# Patient Record
Sex: MALE | Race: WHITE
[De-identification: names, ages, dates, MRNs, and addresses within clinical notes are randomized per-mention and may not be internally consistent; named-entity substitution may affect disease eponyms.]

---

## 2017-04-15 ENCOUNTER — HOSPITAL ENCOUNTER (EMERGENCY)
Dept: HOSPITAL 63 - ER | Age: 62
Discharge: ANOTHER HOSPITAL | End: 2017-04-15
Payer: MEDICARE

## 2017-04-15 VITALS
DIASTOLIC BLOOD PRESSURE: 84 MMHG | DIASTOLIC BLOOD PRESSURE: 84 MMHG | SYSTOLIC BLOOD PRESSURE: 158 MMHG | SYSTOLIC BLOOD PRESSURE: 158 MMHG | SYSTOLIC BLOOD PRESSURE: 158 MMHG | DIASTOLIC BLOOD PRESSURE: 84 MMHG | DIASTOLIC BLOOD PRESSURE: 84 MMHG | SYSTOLIC BLOOD PRESSURE: 158 MMHG

## 2017-04-15 VITALS — WEIGHT: 150 LBS | BODY MASS INDEX: 25.61 KG/M2 | HEIGHT: 64 IN
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
## 2017-04-15 DIAGNOSIS — E03.9: ICD-10-CM

## 2017-04-15 DIAGNOSIS — S72.001A: Primary | ICD-10-CM

## 2017-04-15 DIAGNOSIS — I12.9: ICD-10-CM
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
## 2017-04-15 DIAGNOSIS — I25.10: ICD-10-CM

## 2017-04-15 DIAGNOSIS — Z95.1: ICD-10-CM

## 2017-04-15 DIAGNOSIS — E11.22: ICD-10-CM

## 2017-04-15 DIAGNOSIS — E78.00: ICD-10-CM
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
## 2017-04-15 DIAGNOSIS — N18.9: ICD-10-CM

## 2017-04-15 PROCEDURE — 96374 THER/PROPH/DIAG INJ IV PUSH: CPT

## 2017-04-15 PROCEDURE — 99285 EMERGENCY DEPT VISIT HI MDM: CPT

## 2017-04-15 PROCEDURE — 96375 TX/PRO/DX INJ NEW DRUG ADDON: CPT

## 2017-04-15 PROCEDURE — 73502 X-RAY EXAM HIP UNI 2-3 VIEWS: CPT

## 2017-04-15 NOTE — RAD
HIP RIGHT 2V WITH PELVIS



Clinical Indication: pain s/p fall



Comparison: None.



Technique: Frontal view the pelvis and frontal and frog-leg lateral views of

the right hip are obtained.



Findings: 

There is an acute, traumatic, displaced fracture involving the right femoral

neck with superior displacement of the distal fracture fragment by

approximately 2.5 cm. Right hip joint is maintained. Bony pelvis appears

intact. Left hip joint is maintained. Surrounding soft tissues demonstrate no

acute finding. Prominent arterial calcifications are present.



IMPRESSION:

Acute, displaced right femoral neck fracture, as detailed above.

## 2017-04-15 NOTE — ED.ADGEN
Past History


Past Medical History:  CAD, Diabetes, High Cholesterol, Hypertension, 

Hypothyroid, Immunosuppression, Renal Disease, Renal Failure


Past Surgical History:  Colectomy, Coronary Bypass Surgery, Other


Alcohol Use:  None


Drug Use:  None





Adult General


HPI


HPI





Patient is a 61-year-old male presents emergency department complaining of 

right hip pain. Earlier today he had a mechanical slip and fall landing on that 

hip. Since that time he said difficulty with ambulation had increasing pain. 

Patient denies any other injuries.





Review of Systems


Review of Systems





Constitutional: Denies fever or chills []


Eyes: Denies change in visual acuity, redness, or eye pain []


HENT: Denies nasal congestion or sore throat []


Respiratory: Denies cough or shortness of breath []


Cardiovascular: No additional information not addressed in HPI []


GI: Denies abdominal pain, nausea, vomiting, bloody stools or diarrhea []


: Denies dysuria or hematuria []


Musculoskeletal: Denies back pain or joint pain []


Integument: Denies rash or skin lesions []


Neurologic: Denies headache, focal weakness or sensory changes []


Endocrine: Denies polyuria or polydipsia []





Current Medications


Current Medications





 Current Medications








 Medications


  (Trade)  Dose


 Ordered  Sig/Bhavin  Start Time


 Stop Time Status Last Admin


Dose Admin


 


 Fentanyl Citrate


  (Fentanyl 2ml


 Vial)  75 mcg  1X  ONCE  4/15/17 11:30


 4/15/17 11:32 DC 4/15/17 11:31


75 MCG


 


 Hydromorphone HCl


  (Dilaudid)  0.5 mg  1X  ONCE  4/15/17 12:30


 4/15/17 12:31 DC 4/15/17 12:17


0.5 MG











Allergies


Allergies





 Allergies








Coded Allergies Type Severity Reaction Last Updated Verified


 


  No Known Drug Allergies    12/14/15 No











Physical Exam


Physical Exam





Constitutional: Well developed, well nourished, no acute distress, non-toxic 

appearance. []


HENT: Normocephalic, atraumatic, bilateral external ears normal, oropharynx 

moist, no oral exudates, nose normal. []


Eyes: PERRLA, EOMI, conjunctiva normal, no discharge. [] 


Neck: Normal range of motion, no tenderness, supple, no stridor. [] 


Cardiovascular:Heart rate regular rhythm, no murmur []


Lungs & Thorax:  Bilateral breath sounds clear to auscultation []


Abdomen: Bowel sounds normal, soft, no tenderness, no masses, no pulsatile 

masses. [] 


Skin: Warm, dry, no erythema, no rash. [] 


Extremities: Patient is tender to palpation over his right greater trochanter, 

leg is shortened with minimal rotation.


Neurologic: Alert and oriented X 3, normal motor function, normal sensory 

function, no focal deficits noted. []


Psychologic: Affect normal, judgement normal, mood normal. []





Current Patient Data


Vital Signs





 Vital Signs








  Date Time  Temp Pulse Resp B/P Pulse Ox O2 Delivery O2 Flow Rate FiO2


 


4/15/17 12:25  102 12 158/84 96 Room Air  


 


4/15/17 10:55 97.9       











EKG


EKG


[]





Radiology/Procedures


Radiology/Procedures


HIP RIGHT 2V WITH PELVIS





Clinical Indication: pain s/p fall





Comparison: None.





Technique: Frontal view the pelvis and frontal and frog-leg lateral views of


the right hip are obtained.





Findings: 


There is an acute, traumatic, displaced fracture involving the right femoral


neck with superior displacement of the distal fracture fragment by


approximately 2.5 cm. Right hip joint is maintained. Bony pelvis appears


intact. Left hip joint is maintained. Surrounding soft tissues demonstrate no


acute finding. Prominent arterial calcifications are present.





IMPRESSION:


Acute, displaced right femoral neck fracture, as detailed above.














DICTATED AND SIGNED BY:     CHAN CHRISTIAN MD


DATE:     04/15/17 1126





CC: RAUL TERAN MD; LORI CEDILLO MD ~[]





Course & Med Decision Making


Course & Med Decision Making


Pertinent Labs and Imaging studies reviewed. (See chart for details)


Patient does indeed have a right hip fracture. He has been transferred to 

[facility] treatment.


[]





Final Impression


Final Impression


Right hip fracture []


Problems:  





Dragon Disclaimer


Dragon Disclaimer


This electronic medical record was generated, in whole or in part, using a 

voice recognition dictation system.








RAUL TERAN MD Apr 15, 2017 14:38

## 2017-06-27 ENCOUNTER — HOSPITAL ENCOUNTER (OUTPATIENT)
Dept: HOSPITAL 63 - LAB | Age: 62
Discharge: HOME | End: 2017-06-27
Attending: NURSE PRACTITIONER
Payer: MEDICARE

## 2017-06-27 DIAGNOSIS — Z94.0: ICD-10-CM

## 2017-06-27 DIAGNOSIS — N18.9: ICD-10-CM

## 2017-06-27 DIAGNOSIS — E11.29: ICD-10-CM

## 2017-06-27 DIAGNOSIS — I12.9: Primary | ICD-10-CM

## 2017-06-27 LAB
ALBUMIN SERPL-MCNC: 3.2 G/DL (ref 3.4–5)
ALP SERPL-CCNC: 86 U/L (ref 46–116)
ALT SERPL-CCNC: 14 U/L (ref 16–63)
ANION GAP SERPL CALC-SCNC: 5 MMOL/L (ref 6–14)
AST SERPL-CCNC: 10 U/L (ref 15–37)
BILIRUB DIRECT SERPL-MCNC: 0.1 MG/DL (ref 0–0.2)
BILIRUB SERPL-MCNC: 0.4 MG/DL (ref 0.2–1)
CA-I SERPL ISE-MCNC: 14 MG/DL (ref 8–26)
CALCIUM SERPL-MCNC: 9.2 MG/DL (ref 8.5–10.1)
CHLORIDE SERPL-SCNC: 102 MMOL/L (ref 98–107)
CHOLEST SERPL-MCNC: 146 MG/DL (ref 0–200)
CHOLEST/HDLC SERPL: 2 {RATIO}
CO2 SERPL-SCNC: 32 MMOL/L (ref 21–32)
CREAT SERPL-MCNC: 0.6 MG/DL (ref 0.7–1.3)
GFR SERPLBLD BASED ON 1.73 SQ M-ARVRAT: 136.5 ML/MIN
GLUCOSE SERPL-MCNC: 166 MG/DL (ref 70–99)
HDLC SERPL-MCNC: 52 MG/DL (ref 40–60)
LDLC: 83 MG/DL (ref 0–100)
PHOSPHATE SERPL-MCNC: 3.9 MG/DL (ref 2.6–4.7)
POTASSIUM SERPL-SCNC: 4.5 MMOL/L (ref 3.5–5.1)
PROT SERPL-MCNC: 7.3 G/DL (ref 6.4–8.2)
SODIUM SERPL-SCNC: 139 MMOL/L (ref 136–145)
TRIGL SERPL-MCNC: 57 MG/DL (ref 0–150)
VLDLC: 11 MG/DL (ref 0–40)

## 2017-06-27 PROCEDURE — 83036 HEMOGLOBIN GLYCOSYLATED A1C: CPT

## 2017-06-27 PROCEDURE — 80061 LIPID PANEL: CPT

## 2017-06-27 PROCEDURE — 80076 HEPATIC FUNCTION PANEL: CPT

## 2017-06-27 PROCEDURE — 80069 RENAL FUNCTION PANEL: CPT

## 2017-06-27 PROCEDURE — 80197 ASSAY OF TACROLIMUS: CPT

## 2017-06-27 PROCEDURE — 36415 COLL VENOUS BLD VENIPUNCTURE: CPT

## 2017-06-28 LAB — HBA1C MFR BLD: 7.1 % (ref 4.8–5.6)

## 2017-09-06 ENCOUNTER — HOSPITAL ENCOUNTER (OUTPATIENT)
Dept: HOSPITAL 63 - LAB | Age: 62
Discharge: HOME | End: 2017-09-06
Payer: MEDICARE

## 2017-09-06 DIAGNOSIS — C18.9: Primary | ICD-10-CM

## 2017-09-06 LAB
ALBUMIN SERPL-MCNC: 3 G/DL (ref 3.4–5)
ALBUMIN/GLOB SERPL: 0.8 {RATIO} (ref 1–1.7)
ALP SERPL-CCNC: 82 U/L (ref 46–116)
ALT SERPL-CCNC: 17 U/L (ref 16–63)
ANION GAP SERPL CALC-SCNC: 4 MMOL/L (ref 6–14)
AST SERPL-CCNC: 8 U/L (ref 15–37)
BASOPHILS # BLD AUTO: 0 X10^3/UL (ref 0–0.2)
BASOPHILS NFR BLD: 1 % (ref 0–3)
BILIRUB SERPL-MCNC: 0.4 MG/DL (ref 0.2–1)
BUN/CREAT SERPL: 23 (ref 6–20)
CA-I SERPL ISE-MCNC: 14 MG/DL (ref 8–26)
CALCIUM SERPL-MCNC: 9.4 MG/DL (ref 8.5–10.1)
CHLORIDE SERPL-SCNC: 104 MMOL/L (ref 98–107)
CO2 SERPL-SCNC: 32 MMOL/L (ref 21–32)
CREAT SERPL-MCNC: 0.6 MG/DL (ref 0.7–1.3)
EOSINOPHIL NFR BLD: 0.1 X10^3/UL (ref 0–0.7)
EOSINOPHIL NFR BLD: 2 % (ref 0–3)
ERYTHROCYTE [DISTWIDTH] IN BLOOD BY AUTOMATED COUNT: 16.3 % (ref 11.5–14.5)
GFR SERPLBLD BASED ON 1.73 SQ M-ARVRAT: 136.1 ML/MIN
GLOBULIN SER-MCNC: 4 G/DL (ref 2.2–3.8)
GLUCOSE SERPL-MCNC: 175 MG/DL (ref 70–99)
HCT VFR BLD CALC: 41.8 % (ref 39–53)
HGB BLD-MCNC: 13.6 G/DL (ref 13–17.5)
LYMPHOCYTES # BLD: 1.4 X10^3/UL (ref 1–4.8)
LYMPHOCYTES NFR BLD AUTO: 21 % (ref 24–48)
MCH RBC QN AUTO: 26 PG (ref 25–35)
MCHC RBC AUTO-ENTMCNC: 33 G/DL (ref 31–37)
MCV RBC AUTO: 81 FL (ref 79–100)
MONO #: 0.6 X10^3/UL (ref 0–1.1)
MONOCYTES NFR BLD: 9 % (ref 0–9)
NEUT #: 4.4 X10^3UL (ref 1.8–7.7)
NEUTROPHILS NFR BLD AUTO: 68 % (ref 31–73)
PLATELET # BLD AUTO: 222 X10^3/UL (ref 140–400)
POTASSIUM SERPL-SCNC: 4.9 MMOL/L (ref 3.5–5.1)
PROT SERPL-MCNC: 7 G/DL (ref 6.4–8.2)
RBC # BLD AUTO: 5.19 X10^6/UL (ref 4.3–5.7)
SODIUM SERPL-SCNC: 140 MMOL/L (ref 136–145)
WBC # BLD AUTO: 6.5 X10^3/UL (ref 4–11)

## 2017-09-06 PROCEDURE — 82378 CARCINOEMBRYONIC ANTIGEN: CPT

## 2017-09-06 PROCEDURE — 36415 COLL VENOUS BLD VENIPUNCTURE: CPT

## 2017-09-06 PROCEDURE — 85025 COMPLETE CBC W/AUTO DIFF WBC: CPT

## 2017-09-06 PROCEDURE — 80053 COMPREHEN METABOLIC PANEL: CPT

## 2017-09-26 ENCOUNTER — HOSPITAL ENCOUNTER (OUTPATIENT)
Dept: HOSPITAL 63 - LAB | Age: 62
Discharge: HOME | End: 2017-09-26
Payer: MEDICARE

## 2017-09-26 DIAGNOSIS — Z94.0: ICD-10-CM

## 2017-09-26 DIAGNOSIS — I10: ICD-10-CM

## 2017-09-26 DIAGNOSIS — E11.29: Primary | ICD-10-CM

## 2017-09-26 LAB
ALBUMIN SERPL-MCNC: 3.2 G/DL (ref 3.4–5)
ALBUMIN/GLOB SERPL: 0.8 {RATIO} (ref 1–1.7)
ALP SERPL-CCNC: 80 U/L (ref 46–116)
ALT SERPL-CCNC: 16 U/L (ref 16–63)
ANION GAP SERPL CALC-SCNC: 4 MMOL/L (ref 6–14)
AST SERPL-CCNC: 10 U/L (ref 15–37)
BILIRUB SERPL-MCNC: 0.4 MG/DL (ref 0.2–1)
BUN/CREAT SERPL: 26 (ref 6–20)
CA-I SERPL ISE-MCNC: 13 MG/DL (ref 8–26)
CALCIUM SERPL-MCNC: 9.2 MG/DL (ref 8.5–10.1)
CHLORIDE SERPL-SCNC: 102 MMOL/L (ref 98–107)
CHOLEST SERPL-MCNC: 146 MG/DL (ref 0–200)
CHOLEST/HDLC SERPL: 2 {RATIO}
CO2 SERPL-SCNC: 29 MMOL/L (ref 21–32)
CREAT SERPL-MCNC: 0.5 MG/DL (ref 0.7–1.3)
GFR SERPLBLD BASED ON 1.73 SQ M-ARVRAT: 167.9 ML/MIN
GLOBULIN SER-MCNC: 3.8 G/DL (ref 2.2–3.8)
GLUCOSE SERPL-MCNC: 141 MG/DL (ref 70–99)
HBA1C MFR BLD: 7.1 % (ref 4.8–5.6)
HDLC SERPL-MCNC: 62 MG/DL (ref 40–60)
LDLC: 73 MG/DL (ref 0–100)
POTASSIUM SERPL-SCNC: 4 MMOL/L (ref 3.5–5.1)
PROT SERPL-MCNC: 7 G/DL (ref 6.4–8.2)
SODIUM SERPL-SCNC: 135 MMOL/L (ref 136–145)
TRIGL SERPL-MCNC: 56 MG/DL (ref 0–150)
VLDLC: 11 MG/DL (ref 0–40)

## 2017-09-26 PROCEDURE — 80061 LIPID PANEL: CPT

## 2017-09-26 PROCEDURE — 83036 HEMOGLOBIN GLYCOSYLATED A1C: CPT

## 2017-09-26 PROCEDURE — 80053 COMPREHEN METABOLIC PANEL: CPT

## 2019-01-08 NOTE — CARD
MR#: [id number]

Account#: [id number]

Accession#: [id number]

Date of Study: 01/08/2019

Ordering Physician: LORNA POWELL, 

Referring Physician: LORNA POWELL, 

Tech: Natacha Gonzalez





--------------- APPROVED REPORT --------------





EXAM: Two-dimensional and M-mode echocardiogram with Doppler and color Doppler.



Other Information 

Quality : AverageHR: 74bpm



INDICATION

Chronic Diastolic Heart Failure



2D DIMENSIONS 

RVDd3.1 (2.9-3.5cm)Left Atrium(2D)4.4 (1.6-4.0cm)

IVSd1.2 (0.7-1.1cm)Aortic Root(2D)2.9 (2.0-3.7cm)

LVDd3.9 (3.9-5.9cm)LVOT Diameter2.0 (1.8-2.4cm)

PWd1.0 (0.7-1.1cm)LVDs2.9 (2.5-4.0cm)

FS (%) 27.0 %SV35.5 ml

LVEF(%)53.2 (>50%)



Aortic Valve

AoV Peak James.221.9cm/sAoV VTI46.3cm

AO Peak GR.19.7mmHgLVOT Peak James.111.9cm/s

LVOT  VTI 24.10cmAO Mean GR.10mmHg

NIKOLAS (VMAX)1.06ef1GLV   (VTI)1.59cm2



Mitral Valve

MV E Ogpbvfot31.8cm/sMV DECEL ZZAP391ic

MV A Dzmmepne794.7cm/sE/A  Ratio0.9



Pulmonary Valve

PV Peak Pbmqrzur99.8cm/sPV Peak Grad.4mmHg



Tricuspid Valve

TR P. Lccrdyxv256ed/sRAP JWILQDEO5tlSc

TR Peak Gr.41vxZtPJLZ86foNk



Pulmonary Vein

S1 Wqpcicet61.5cm/sD2 Lgfvkukk88.6cm/s



 LEFT VENTRICLE 

The left ventricle is normal size. There is borderline to mild concentric left ventricular hypertroph
y. The left ventricular systolic function is normal and the ejection fraction is within normal range.
 The Ejection Fraction is 50-55%. There is normal LV segmental wall motion. Transmitral Doppler flow 
pattern is Grade I-abnormal relaxation pattern.



 RIGHT VENTRICLE 

The right ventricle is normal size. There is normal right ventricular wall thickness. The right ventr
icular systolic function is normal.



 ATRIA 

The left atrium size is normal. The right atrium size is normal. The interatrial septum is intact wit
h no evidence for an atrial septal defect or patent foramen ovale as noted on 2-D or Doppler imaging.




 AORTIC VALVE 

The aortic valve is calcified with fusion of the left and non-coronary cusps. Not well visualized. Do
ppler and Color Flow revealed trace aortic regurgitation. Calculated aortic valve area is 1.7 cm2 wit
h maximum pressure gradient of 19.7 mmHg and mean pressure gradient of 10 mmHg. There is no significa
nt aortic valvular stenosis.



 MITRAL VALVE 

The mitral valve is normal in structure and function. There is no evidence of mitral valve prolapse. 
There is no mitral valve stenosis. Doppler and Color Flow revealed no mitral valve regurgitation note
d.



 TRICUSPID VALVE 

The tricuspid valve is normal in structure and function. Doppler and Color Flow revealed no tricuspid
 valve regurgitation noted. There is no tricuspid valve stenosis.



 PULMONIC VALVE 

The pulmonic valve is not well visualized. Doppler and Color Flow revealed trace pulmonic valvular re
gurgitation. There is no pulmonic valvular stenosis.



 GREAT VESSELS 

The aortic root is normal in size. The IVC is normal in size and collapses >50% with inspiration.



 PERICARDIAL EFFUSION 

There is no evidence of significant pericardial effusion.



Critical Notification

Critical Value: No



<Conclusion>

The left ventricular systolic function is normal and the ejection fraction is within normal range. Th
e Ejection Fraction is 50-55%.

There is normal LV segmental wall motion.

The aortic valve is calcified with fusion of the left and non-coronary cusps. Not well visualized.

Calculated aortic valve area is 1.7 cm2 with maximum pressure gradient of 19.7 mmHg and mean pressure
 gradient of 10 mmHg. There is no significant aortic valvular stenosis.



Signed by : Jose Rafael Bates, 

Electronically Approved : 01/08/2019 09:48:31

## 2019-12-11 ENCOUNTER — HOSPITAL ENCOUNTER (OUTPATIENT)
Dept: HOSPITAL 61 - SURG | Age: 64
LOS: 2 days | End: 2019-12-13
Attending: SURGERY
Payer: COMMERCIAL

## 2019-12-11 VITALS — WEIGHT: 180 LBS | BODY MASS INDEX: 29.99 KG/M2 | HEIGHT: 65 IN

## 2019-12-11 VITALS — DIASTOLIC BLOOD PRESSURE: 65 MMHG | SYSTOLIC BLOOD PRESSURE: 127 MMHG

## 2019-12-11 DIAGNOSIS — Z79.899: ICD-10-CM

## 2019-12-11 DIAGNOSIS — Z52.6: Primary | ICD-10-CM

## 2019-12-11 LAB
ALBUMIN SERPL-MCNC: 1.1 G/DL (ref 3.4–5)
ALP SERPL-CCNC: 88 U/L (ref 46–116)
ALT SERPL-CCNC: 188 U/L (ref 16–63)
AMYLASE SERPL-CCNC: 192 U/L (ref 25–115)
ANION GAP SERPL CALC-SCNC: 19 MMOL/L (ref 6–14)
ANION GAP SERPL CALC-SCNC: 20 MMOL/L (ref 6–14)
APTT BLD: 42 SEC (ref 24–38)
AST SERPL-CCNC: 56 U/L (ref 15–37)
BASOPHILS # BLD AUTO: 0 X10^3/UL (ref 0–0.2)
BASOPHILS NFR BLD: 0 % (ref 0–3)
BILIRUB DIRECT SERPL-MCNC: 0.4 MG/DL (ref 0–0.2)
BILIRUB SERPL-MCNC: 0.5 MG/DL (ref 0.2–1)
BUN SERPL-MCNC: 48 MG/DL (ref 8–26)
BUN SERPL-MCNC: 49 MG/DL (ref 8–26)
CALCIUM SERPL-MCNC: 6.5 MG/DL (ref 8.5–10.1)
CALCIUM SERPL-MCNC: 6.7 MG/DL (ref 8.5–10.1)
CHLORIDE SERPL-SCNC: 102 MMOL/L (ref 98–107)
CHLORIDE SERPL-SCNC: 104 MMOL/L (ref 98–107)
CK SERPL-CCNC: 59 U/L (ref 39–308)
CK SERPL-CCNC: 68 U/L (ref 39–308)
CO2 SERPL-SCNC: 16 MMOL/L (ref 21–32)
CO2 SERPL-SCNC: 16 MMOL/L (ref 21–32)
CREAT SERPL-MCNC: 3.6 MG/DL (ref 0.7–1.3)
CREAT SERPL-MCNC: 3.7 MG/DL (ref 0.7–1.3)
EOSINOPHIL NFR BLD: 0 % (ref 0–3)
EOSINOPHIL NFR BLD: 0 X10^3/UL (ref 0–0.7)
ERYTHROCYTE [DISTWIDTH] IN BLOOD BY AUTOMATED COUNT: 14.5 % (ref 11.5–14.5)
FIBRINOGEN PPP-MCNC: 790 MG/DL (ref 200–440)
GAMMA GLUTAMYL TRANSPEPTIDASE: 110 U/L (ref 10–85)
GAMMA GLUTAMYL TRANSPEPTIDASE: 113 U/L (ref 10–85)
GFR SERPLBLD BASED ON 1.73 SQ M-ARVRAT: 16.6 ML/MIN
GFR SERPLBLD BASED ON 1.73 SQ M-ARVRAT: 17.1 ML/MIN
GLUCOSE SERPL-MCNC: 144 MG/DL (ref 70–99)
GLUCOSE SERPL-MCNC: 165 MG/DL (ref 70–99)
HCT VFR BLD CALC: 40.3 % (ref 39–53)
HGB BLD-MCNC: 13.4 G/DL (ref 13–17.5)
LDH SERPL L TO P-CCNC: 652 U/L (ref 85–227)
LDH SERPL L TO P-CCNC: 653 U/L (ref 85–227)
LIPASE: < 10 U/L (ref 73–393)
LYMPHOCYTES # BLD: 0.5 X10^3/UL (ref 1–4.8)
LYMPHOCYTES NFR BLD AUTO: 3 % (ref 24–48)
MAGNESIUM SERPL-MCNC: 0.1 MG/DL (ref 1.8–2.4)
MCH RBC QN AUTO: 29 PG (ref 25–35)
MCHC RBC AUTO-ENTMCNC: 33 G/DL (ref 31–37)
MCV RBC AUTO: 87 FL (ref 79–100)
MONO #: 1.5 X10^3/UL (ref 0–1.1)
MONOCYTES NFR BLD: 8 % (ref 0–9)
NEUT #: 16.6 X10^3/UL (ref 1.8–7.7)
NEUTROPHILS NFR BLD AUTO: 90 % (ref 31–73)
PHOSPHATE SERPL-MCNC: 8.8 MG/DL (ref 2.6–4.7)
PLATELET # BLD AUTO: 135 X10^3/UL (ref 140–400)
POTASSIUM SERPL-SCNC: 5 MMOL/L (ref 3.5–5.1)
POTASSIUM SERPL-SCNC: 5.5 MMOL/L (ref 3.5–5.1)
PROT SERPL-MCNC: 4.4 G/DL (ref 6.4–8.2)
PROTHROMBIN TIME: 17.3 SEC (ref 11.7–14)
RBC # BLD AUTO: 4.62 X10^6/UL (ref 4.3–5.7)
SODIUM SERPL-SCNC: 138 MMOL/L (ref 136–145)
SODIUM SERPL-SCNC: 139 MMOL/L (ref 136–145)
WBC # BLD AUTO: 18.6 X10^3/UL (ref 4–11)

## 2019-12-11 PROCEDURE — 87070 CULTURE OTHR SPECIMN AEROBIC: CPT

## 2019-12-11 PROCEDURE — 83036 HEMOGLOBIN GLYCOSYLATED A1C: CPT

## 2019-12-11 PROCEDURE — 74170 CT ABD WO CNTRST FLWD CNTRST: CPT

## 2019-12-11 PROCEDURE — 82150 ASSAY OF AMYLASE: CPT

## 2019-12-11 PROCEDURE — 81001 URINALYSIS AUTO W/SCOPE: CPT

## 2019-12-11 PROCEDURE — 83615 LACTATE (LD) (LDH) ENZYME: CPT

## 2019-12-11 PROCEDURE — 82550 ASSAY OF CK (CPK): CPT

## 2019-12-11 PROCEDURE — 86901 BLOOD TYPING SEROLOGIC RH(D): CPT

## 2019-12-11 PROCEDURE — 86850 RBC ANTIBODY SCREEN: CPT

## 2019-12-11 PROCEDURE — 83690 ASSAY OF LIPASE: CPT

## 2019-12-11 PROCEDURE — 85730 THROMBOPLASTIN TIME PARTIAL: CPT

## 2019-12-11 PROCEDURE — A9512 TC99M PERTECHNETATE: HCPCS

## 2019-12-11 PROCEDURE — 85007 BL SMEAR W/DIFF WBC COUNT: CPT

## 2019-12-11 PROCEDURE — 36600 WITHDRAWAL OF ARTERIAL BLOOD: CPT

## 2019-12-11 PROCEDURE — 85610 PROTHROMBIN TIME: CPT

## 2019-12-11 PROCEDURE — 82310 ASSAY OF CALCIUM: CPT

## 2019-12-11 PROCEDURE — 83735 ASSAY OF MAGNESIUM: CPT

## 2019-12-11 PROCEDURE — 70496 CT ANGIOGRAPHY HEAD: CPT

## 2019-12-11 PROCEDURE — 80048 BASIC METABOLIC PNL TOTAL CA: CPT

## 2019-12-11 PROCEDURE — 82962 GLUCOSE BLOOD TEST: CPT

## 2019-12-11 PROCEDURE — 84100 ASSAY OF PHOSPHORUS: CPT

## 2019-12-11 PROCEDURE — 85027 COMPLETE CBC AUTOMATED: CPT

## 2019-12-11 PROCEDURE — 87086 URINE CULTURE/COLONY COUNT: CPT

## 2019-12-11 PROCEDURE — 36415 COLL VENOUS BLD VENIPUNCTURE: CPT

## 2019-12-11 PROCEDURE — 83605 ASSAY OF LACTIC ACID: CPT

## 2019-12-11 PROCEDURE — 82805 BLOOD GASES W/O2 SATURATION: CPT

## 2019-12-11 PROCEDURE — 78606 BRAIN IMAGE W/FLOW 4 + VIEWS: CPT

## 2019-12-11 PROCEDURE — 71045 X-RAY EXAM CHEST 1 VIEW: CPT

## 2019-12-11 PROCEDURE — 80076 HEPATIC FUNCTION PANEL: CPT

## 2019-12-11 PROCEDURE — 87205 SMEAR GRAM STAIN: CPT

## 2019-12-11 PROCEDURE — G0378 HOSPITAL OBSERVATION PER HR: HCPCS

## 2019-12-11 PROCEDURE — 87040 BLOOD CULTURE FOR BACTERIA: CPT

## 2019-12-11 PROCEDURE — 85384 FIBRINOGEN ACTIVITY: CPT

## 2019-12-11 PROCEDURE — 86900 BLOOD TYPING SEROLOGIC ABO: CPT

## 2019-12-11 PROCEDURE — 85025 COMPLETE CBC W/AUTO DIFF WBC: CPT

## 2019-12-11 PROCEDURE — 82977 ASSAY OF GGT: CPT

## 2019-12-11 PROCEDURE — 96374 THER/PROPH/DIAG INJ IV PUSH: CPT

## 2019-12-11 PROCEDURE — P9046 ALBUMIN (HUMAN), 25%, 20 ML: HCPCS

## 2019-12-11 RX ADMIN — SODIUM CHLORIDE, SODIUM LACTATE, POTASSIUM CHLORIDE, AND CALCIUM CHLORIDE SCH MLS/HR: .6; .31; .03; .02 INJECTION, SOLUTION INTRAVENOUS at 18:07

## 2019-12-11 RX ADMIN — DOXYCYCLINE SCH MLS/HR: 100 INJECTION, POWDER, LYOPHILIZED, FOR SOLUTION INTRAVENOUS at 23:14

## 2019-12-11 RX ADMIN — MYCOPHENOLATE MOFETIL SCH MG: 200 POWDER, FOR SUSPENSION ORAL at 23:20

## 2019-12-11 RX ADMIN — TACROLIMUS SCH MG: 0.5 CAPSULE ORAL at 23:57

## 2019-12-11 RX ADMIN — PIPERACILLIN SODIUM AND TAZOBACTAM SODIUM SCH MLS/HR: 2; .25 INJECTION, POWDER, LYOPHILIZED, FOR SOLUTION INTRAVENOUS at 19:03

## 2019-12-11 NOTE — RAD
Study: CT angiography of the head

 

INDICATION: Organ donor. Brain dead.

 

COMPARISON: CT head 12/9/2019

 

TECHNIQUE: Axial CT imaging of the head performed both prior to and after 

the intravenous administration of 75 cc Omnipaque 350. 3-D MIP 

reconstructions were obtained.

 

One or more of the following individualized dose reduction techniques were

utilized for this examination:  

 

1. Automated exposure control

2. Adjustment of the mA and/or kV according to patient size

3. Use of iterative reconstruction technique.

 

FINDINGS:

 

Diffuse cerebral edema with generalized loss of sulcation and absent 

gray-white matter interface. Absent contrast opacification of the 

intracranial vertebral arteries and the basilar artery. There is 

maintained opacification of the proximal segments of the anterior middle 

cerebral arteries bilaterally but loss of enhancement more peripherally. 

Extensive background calcific atherosclerosis.

 

Endotracheal and orogastric tubes are partially visualized. Expected 

paranasal sinus mucosal thickening and a small amount of fluid in the 

setting of intubation.

 

Bilateral phthisis bulbi.

 

IMPRESSION:

1. Diffuse cerebral edema with loss of gray-white matter differentiation 

and absent visualization of sulci.

2. Absent opacification of the intracranial vertebral arteries and the 

basilar artery which could be related to thrombosis or lack of arterial 

flow due to increased intracranial pressure. There is maintained contrast 

opacification of the proximal anterior and middle cerebral artery segments

but with loss of opacification distally in keeping with increased flow 

resistance.

 

Electronically signed by: SHADIA HOLLIS MD (12/11/2019 10:06 PM) 

[id number]

## 2019-12-12 LAB
% BANDS: 37 % (ref 0–9)
% LYMPHS: 4 % (ref 24–48)
% MONOS: 5 % (ref 0–10)
% SEGS: 54 % (ref 35–66)
ACANTHOCYTES BLD QL SMEAR: (no result)
ALBUMIN SERPL-MCNC: 1.2 G/DL (ref 3.4–5)
ALBUMIN SERPL-MCNC: 1.2 G/DL (ref 3.4–5)
ALBUMIN SERPL-MCNC: 1.3 G/DL (ref 3.4–5)
ALBUMIN SERPL-MCNC: 1.4 G/DL (ref 3.4–5)
ALBUMIN SERPL-MCNC: 1.6 G/DL (ref 3.4–5)
ALP SERPL-CCNC: 101 U/L (ref 46–116)
ALP SERPL-CCNC: 111 U/L (ref 46–116)
ALP SERPL-CCNC: 120 U/L (ref 46–116)
ALP SERPL-CCNC: 81 U/L (ref 46–116)
ALP SERPL-CCNC: 90 U/L (ref 46–116)
ALT SERPL-CCNC: 117 U/L (ref 16–63)
ALT SERPL-CCNC: 124 U/L (ref 16–63)
ALT SERPL-CCNC: 126 U/L (ref 16–63)
ALT SERPL-CCNC: 153 U/L (ref 16–63)
ALT SERPL-CCNC: 163 U/L (ref 16–63)
AMYLASE SERPL-CCNC: 211 U/L (ref 25–115)
AMYLASE SERPL-CCNC: 232 U/L (ref 25–115)
AMYLASE SERPL-CCNC: 267 U/L (ref 25–115)
ANION GAP SERPL CALC-SCNC: 17 MMOL/L (ref 6–14)
ANION GAP SERPL CALC-SCNC: 19 MMOL/L (ref 6–14)
APTT BLD: 35 SEC (ref 24–38)
APTT BLD: 36 SEC (ref 24–38)
APTT BLD: 38 SEC (ref 24–38)
APTT BLD: 39 SEC (ref 24–38)
APTT BLD: 46 SEC (ref 24–38)
APTT PPP: YELLOW S
AST SERPL-CCNC: 48 U/L (ref 15–37)
AST SERPL-CCNC: 53 U/L (ref 15–37)
AST SERPL-CCNC: 55 U/L (ref 15–37)
AST SERPL-CCNC: 66 U/L (ref 15–37)
AST SERPL-CCNC: 73 U/L (ref 15–37)
BACTERIA #/AREA URNS HPF: 0 /HPF
BASE EXCESS ABG: -10 MMOL/L (ref -3–3)
BASE EXCESS ABG: -10 MMOL/L (ref -3–3)
BASE EXCESS ABG: -13 MMOL/L (ref -3–3)
BASOPHILS # BLD AUTO: 0 X10^3/UL (ref 0–0.2)
BASOPHILS NFR BLD: 0 % (ref 0–3)
BILIRUB DIRECT SERPL-MCNC: 0.6 MG/DL (ref 0–0.2)
BILIRUB DIRECT SERPL-MCNC: 0.7 MG/DL (ref 0–0.2)
BILIRUB DIRECT SERPL-MCNC: 0.9 MG/DL (ref 0–0.2)
BILIRUB SERPL-MCNC: 0.8 MG/DL (ref 0.2–1)
BILIRUB SERPL-MCNC: 0.8 MG/DL (ref 0.2–1)
BILIRUB SERPL-MCNC: 0.9 MG/DL (ref 0.2–1)
BILIRUB SERPL-MCNC: 1 MG/DL (ref 0.2–1)
BILIRUB SERPL-MCNC: 1 MG/DL (ref 0.2–1)
BILIRUB UR QL STRIP: (no result)
BUN SERPL-MCNC: 52 MG/DL (ref 8–26)
BUN SERPL-MCNC: 53 MG/DL (ref 8–26)
BUN SERPL-MCNC: 54 MG/DL (ref 8–26)
BUN SERPL-MCNC: 56 MG/DL (ref 8–26)
BURR CELLS BLD QL SMEAR: (no result)
CALCIUM SERPL-MCNC: 7.2 MG/DL (ref 8.5–10.1)
CALCIUM SERPL-MCNC: 7.3 MG/DL (ref 8.5–10.1)
CALCIUM SERPL-MCNC: 7.9 MG/DL (ref 8.5–10.1)
CALCIUM SERPL-MCNC: 8.4 MG/DL (ref 8.5–10.1)
CHLORIDE SERPL-SCNC: 101 MMOL/L (ref 98–107)
CHLORIDE SERPL-SCNC: 102 MMOL/L (ref 98–107)
CK SERPL-CCNC: 108 U/L (ref 39–308)
CK SERPL-CCNC: 115 U/L (ref 39–308)
CK SERPL-CCNC: 117 U/L (ref 39–308)
CK SERPL-CCNC: 80 U/L (ref 39–308)
CO2 SERPL-SCNC: 16 MMOL/L (ref 21–32)
CO2 SERPL-SCNC: 17 MMOL/L (ref 21–32)
CO2 SERPL-SCNC: 18 MMOL/L (ref 21–32)
CO2 SERPL-SCNC: 20 MMOL/L (ref 21–32)
CREAT SERPL-MCNC: 3.8 MG/DL (ref 0.7–1.3)
CREAT SERPL-MCNC: 4 MG/DL (ref 0.7–1.3)
CREAT SERPL-MCNC: 4.2 MG/DL (ref 0.7–1.3)
CREAT SERPL-MCNC: 4.5 MG/DL (ref 0.7–1.3)
EOSINOPHIL NFR BLD: 0 % (ref 0–3)
EOSINOPHIL NFR BLD: 0 % (ref 0–3)
EOSINOPHIL NFR BLD: 0 X10^3/UL (ref 0–0.7)
EOSINOPHIL NFR BLD: 0.1 X10^3/UL (ref 0–0.7)
EOSINOPHIL NFR BLD: 1 % (ref 0–3)
EOSINOPHIL NFR BLD: 1 % (ref 0–3)
ERYTHROCYTE [DISTWIDTH] IN BLOOD BY AUTOMATED COUNT: 14.5 % (ref 11.5–14.5)
ERYTHROCYTE [DISTWIDTH] IN BLOOD BY AUTOMATED COUNT: 14.6 % (ref 11.5–14.5)
ERYTHROCYTE [DISTWIDTH] IN BLOOD BY AUTOMATED COUNT: 14.7 % (ref 11.5–14.5)
ERYTHROCYTE [DISTWIDTH] IN BLOOD BY AUTOMATED COUNT: 14.8 % (ref 11.5–14.5)
ERYTHROCYTE [DISTWIDTH] IN BLOOD BY AUTOMATED COUNT: 15 % (ref 11.5–14.5)
FIBRINOGEN PPP-MCNC: (no result) MG/DL
FIBRINOGEN PPP-MCNC: 536 MG/DL (ref 200–440)
FIBRINOGEN PPP-MCNC: 743 MG/DL (ref 200–440)
FIBRINOGEN PPP-MCNC: 830 MG/DL (ref 200–440)
FIBRINOGEN PPP-MCNC: 844 MG/DL (ref 200–440)
FIBRINOGEN PPP-MCNC: 873 MG/DL (ref 200–440)
GAMMA GLUTAMYL TRANSPEPTIDASE: 115 U/L (ref 10–85)
GAMMA GLUTAMYL TRANSPEPTIDASE: 120 U/L (ref 10–85)
GAMMA GLUTAMYL TRANSPEPTIDASE: 123 U/L (ref 10–85)
GAMMA GLUTAMYL TRANSPEPTIDASE: 128 U/L (ref 10–85)
GFR SERPLBLD BASED ON 1.73 SQ M-ARVRAT: 13.2 ML/MIN
GFR SERPLBLD BASED ON 1.73 SQ M-ARVRAT: 14.3 ML/MIN
GFR SERPLBLD BASED ON 1.73 SQ M-ARVRAT: 15.1 ML/MIN
GFR SERPLBLD BASED ON 1.73 SQ M-ARVRAT: 16.1 ML/MIN
GLUCOSE SERPL-MCNC: 224 MG/DL (ref 70–99)
GLUCOSE SERPL-MCNC: 245 MG/DL (ref 70–99)
GLUCOSE SERPL-MCNC: 273 MG/DL (ref 70–99)
GLUCOSE SERPL-MCNC: 324 MG/DL (ref 70–99)
HBA1C MFR BLD: 6.5 % (ref 4.8–5.6)
HCO3 BLDA-SCNC: 15 MMOL/L (ref 21–28)
HCO3 BLDA-SCNC: 17 MMOL/L (ref 21–28)
HCO3 BLDA-SCNC: 17 MMOL/L (ref 21–28)
HCT VFR BLD CALC: 24.8 % (ref 39–53)
HCT VFR BLD CALC: 34.7 % (ref 39–53)
HCT VFR BLD CALC: 37.1 % (ref 39–53)
HCT VFR BLD CALC: 39 % (ref 39–53)
HCT VFR BLD CALC: 39.2 % (ref 39–53)
HGB BLD-MCNC: 11.5 G/DL (ref 13–17.5)
HGB BLD-MCNC: 12.2 G/DL (ref 13–17.5)
HGB BLD-MCNC: 12.7 G/DL (ref 13–17.5)
HGB BLD-MCNC: 12.7 G/DL (ref 13–17.5)
HGB BLD-MCNC: 8.3 G/DL (ref 13–17.5)
INSPIRATION SETTING TIME VENT: (no result)
INSPIRATION SETTING TIME VENT: 100
INSPIRATION SETTING TIME VENT: 100
LDH SERPL L TO P-CCNC: 619 U/L (ref 85–227)
LDH SERPL L TO P-CCNC: 654 U/L (ref 85–227)
LDH SERPL L TO P-CCNC: 667 U/L (ref 85–227)
LDH SERPL L TO P-CCNC: 678 U/L (ref 85–227)
LIPASE: 20 U/L (ref 73–393)
LIPASE: 23 U/L (ref 73–393)
LIPASE: 25 U/L (ref 73–393)
LYMPHOCYTES # BLD: 0.3 X10^3/UL (ref 1–4.8)
LYMPHOCYTES # BLD: 0.4 X10^3/UL (ref 1–4.8)
LYMPHOCYTES # BLD: 0.4 X10^3/UL (ref 1–4.8)
LYMPHOCYTES # BLD: 0.5 X10^3/UL (ref 1–4.8)
LYMPHOCYTES NFR BLD AUTO: 2 % (ref 24–48)
MAGNESIUM SERPL-MCNC: 1.7 MG/DL (ref 1.8–2.4)
MAGNESIUM SERPL-MCNC: 1.9 MG/DL (ref 1.8–2.4)
MAGNESIUM SERPL-MCNC: 2 MG/DL (ref 1.8–2.4)
MAGNESIUM SERPL-MCNC: 2.1 MG/DL (ref 1.8–2.4)
MAGNESIUM SERPL-MCNC: 2.2 MG/DL (ref 1.8–2.4)
MCH RBC QN AUTO: 28 PG (ref 25–35)
MCH RBC QN AUTO: 29 PG (ref 25–35)
MCHC RBC AUTO-ENTMCNC: 32 G/DL (ref 31–37)
MCHC RBC AUTO-ENTMCNC: 33 G/DL (ref 31–37)
MCV RBC AUTO: 87 FL (ref 79–100)
MCV RBC AUTO: 88 FL (ref 79–100)
MCV RBC AUTO: 88 FL (ref 79–100)
MONO #: 0.6 X10^3/UL (ref 0–1.1)
MONO #: 0.9 X10^3/UL (ref 0–1.1)
MONO #: 1.1 X10^3/UL (ref 0–1.1)
MONO #: 1.3 X10^3/UL (ref 0–1.1)
MONOCYTES NFR BLD: 4 % (ref 0–9)
MONOCYTES NFR BLD: 5 % (ref 0–9)
MONOCYTES NFR BLD: 5 % (ref 0–9)
MONOCYTES NFR BLD: 6 % (ref 0–9)
NEUT #: 13.8 X10^3/UL (ref 1.8–7.7)
NEUT #: 18.1 X10^3/UL (ref 1.8–7.7)
NEUT #: 19.1 X10^3/UL (ref 1.8–7.7)
NEUT #: 19.5 X10^3/UL (ref 1.8–7.7)
NEUTROPHILS NFR BLD AUTO: 91 % (ref 31–73)
NEUTROPHILS NFR BLD AUTO: 92 % (ref 31–73)
NEUTROPHILS NFR BLD AUTO: 93 % (ref 31–73)
NEUTROPHILS NFR BLD AUTO: 93 % (ref 31–73)
NITRITE UR QL STRIP: NEGATIVE
PCO2 BLDA: 39 MMHG (ref 35–46)
PCO2 BLDA: 40 MMHG (ref 35–46)
PCO2 BLDA: 40 MMHG (ref 35–46)
PCO2 TEMP ADJ BLD: 39 MMHG
PH TEMP ADJ BLD: 7.26 [PH]
PH UR STRIP: 5 [PH]
PHOSPHATE SERPL-MCNC: 7.2 MG/DL (ref 2.6–4.7)
PHOSPHATE SERPL-MCNC: 8.6 MG/DL (ref 2.6–4.7)
PHOSPHATE SERPL-MCNC: 8.9 MG/DL (ref 2.6–4.7)
PHOSPHATE SERPL-MCNC: 9.3 MG/DL (ref 2.6–4.7)
PLATELET # BLD AUTO: 122 X10^3/UL (ref 140–400)
PLATELET # BLD AUTO: 124 X10^3/UL (ref 140–400)
PLATELET # BLD AUTO: 135 X10^3/UL (ref 140–400)
PLATELET # BLD AUTO: 137 X10^3/UL (ref 140–400)
PLATELET # BLD AUTO: 156 X10^3/UL (ref 140–400)
PLATELET # BLD EST: (no result) 10*3/UL
PO2 BLDA: 142 MMHG (ref 65–108)
PO2 BLDA: 70 MMHG (ref 65–108)
PO2 BLDA: 80 MMHG (ref 65–108)
PO2 TEMP ADJ BLD: 135 MMHG
POLYCHROMASIA BLD QL SMEAR: SLIGHT
POTASSIUM SERPL-SCNC: 5.3 MMOL/L (ref 3.5–5.1)
POTASSIUM SERPL-SCNC: 5.7 MMOL/L (ref 3.5–5.1)
POTASSIUM SERPL-SCNC: 5.8 MMOL/L (ref 3.5–5.1)
POTASSIUM SERPL-SCNC: 5.9 MMOL/L (ref 3.5–5.1)
PROT SERPL-MCNC: 3.9 G/DL (ref 6.4–8.2)
PROT SERPL-MCNC: 3.9 G/DL (ref 6.4–8.2)
PROT SERPL-MCNC: 4 G/DL (ref 6.4–8.2)
PROT SERPL-MCNC: 4.3 G/DL (ref 6.4–8.2)
PROT SERPL-MCNC: 5.1 G/DL (ref 6.4–8.2)
PROT UR STRIP-MCNC: 100 MG/DL
PROTHROMBIN TIME: 17 SEC (ref 11.7–14)
PROTHROMBIN TIME: 17.5 SEC (ref 11.7–14)
PROTHROMBIN TIME: 17.7 SEC (ref 11.7–14)
PROTHROMBIN TIME: 17.8 SEC (ref 11.7–14)
PROTHROMBIN TIME: 22.7 SEC (ref 11.7–14)
RBC # BLD AUTO: 2.83 X10^6/UL (ref 4.3–5.7)
RBC # BLD AUTO: 3.98 X10^6/UL (ref 4.3–5.7)
RBC # BLD AUTO: 4.24 X10^6/UL (ref 4.3–5.7)
RBC # BLD AUTO: 4.47 X10^6/UL (ref 4.3–5.7)
RBC # BLD AUTO: 4.47 X10^6/UL (ref 4.3–5.7)
RBC #/AREA URNS HPF: >40 /HPF (ref 0–2)
SAO2 % BLDA: 94 % (ref 92–99)
SAO2 % BLDA: 95 % (ref 92–99)
SAO2 % BLDA: 99 % (ref 92–99)
SCHISTOCYTES BLD QL SMEAR: (no result)
SODIUM SERPL-SCNC: 136 MMOL/L (ref 136–145)
SODIUM SERPL-SCNC: 137 MMOL/L (ref 136–145)
SODIUM SERPL-SCNC: 138 MMOL/L (ref 136–145)
SODIUM SERPL-SCNC: 139 MMOL/L (ref 136–145)
SPERM #/AREA URNS HPF: PRESENT /HPF
SPHEROCYTES BLD QL SMEAR: (no result)
UROBILINOGEN UR-MCNC: 0.2 MG/DL
WBC # BLD AUTO: 14.8 X10^3/UL (ref 4–11)
WBC # BLD AUTO: 19.4 X10^3/UL (ref 4–11)
WBC # BLD AUTO: 21 X10^3/UL (ref 4–11)
WBC # BLD AUTO: 21.2 X10^3/UL (ref 4–11)
WBC # BLD AUTO: 24.6 X10^3/UL (ref 4–11)
WBC #/AREA URNS HPF: (no result) /HPF (ref 0–4)

## 2019-12-12 RX ADMIN — PIPERACILLIN SODIUM AND TAZOBACTAM SODIUM SCH MLS/HR: 2; .25 INJECTION, POWDER, LYOPHILIZED, FOR SOLUTION INTRAVENOUS at 05:58

## 2019-12-12 RX ADMIN — TACROLIMUS SCH MG: 0.5 CAPSULE ORAL at 10:41

## 2019-12-12 RX ADMIN — TACROLIMUS SCH MG: 0.5 CAPSULE ORAL at 21:00

## 2019-12-12 RX ADMIN — SODIUM CHLORIDE, SODIUM LACTATE, POTASSIUM CHLORIDE, AND CALCIUM CHLORIDE SCH MLS/HR: .6; .31; .03; .02 INJECTION, SOLUTION INTRAVENOUS at 01:18

## 2019-12-12 RX ADMIN — PIPERACILLIN SODIUM AND TAZOBACTAM SODIUM SCH MLS/HR: 2; .25 INJECTION, POWDER, LYOPHILIZED, FOR SOLUTION INTRAVENOUS at 00:16

## 2019-12-12 RX ADMIN — PIPERACILLIN SODIUM AND TAZOBACTAM SODIUM SCH MLS/HR: 2; .25 INJECTION, POWDER, LYOPHILIZED, FOR SOLUTION INTRAVENOUS at 18:26

## 2019-12-12 RX ADMIN — MYCOPHENOLATE MOFETIL SCH MG: 200 POWDER, FOR SUSPENSION ORAL at 09:39

## 2019-12-12 RX ADMIN — Medication PRN MLS/HR: at 14:54

## 2019-12-12 RX ADMIN — MYCOPHENOLATE MOFETIL SCH MG: 200 POWDER, FOR SUSPENSION ORAL at 21:00

## 2019-12-12 RX ADMIN — DOXYCYCLINE SCH MLS/HR: 100 INJECTION, POWDER, LYOPHILIZED, FOR SOLUTION INTRAVENOUS at 09:40

## 2019-12-12 RX ADMIN — DOXYCYCLINE SCH MLS/HR: 100 INJECTION, POWDER, LYOPHILIZED, FOR SOLUTION INTRAVENOUS at 21:00

## 2019-12-12 RX ADMIN — Medication PRN MLS/HR: at 23:31

## 2019-12-12 RX ADMIN — PIPERACILLIN SODIUM AND TAZOBACTAM SODIUM SCH MLS/HR: 2; .25 INJECTION, POWDER, LYOPHILIZED, FOR SOLUTION INTRAVENOUS at 23:37

## 2019-12-12 RX ADMIN — Medication PRN MLS/HR: at 07:59

## 2019-12-12 RX ADMIN — PIPERACILLIN SODIUM AND TAZOBACTAM SODIUM SCH MLS/HR: 2; .25 INJECTION, POWDER, LYOPHILIZED, FOR SOLUTION INTRAVENOUS at 12:59

## 2019-12-12 NOTE — RAD
PQRS Compliance statement:

 

One or more of the following individualized dose reduction techniques were

utilized for this examination:

1. Automated exposure control.

2. Adjustment of the mA and/or kV according to patient size.

3. Use of iterative reconstruction technique.

 

INDICATION: Liver evaluation for organ donation.

 

TECHNIQUE: CT abdomen without and with IV contrast with MIP reformats.

 

COMPARISON: None

 

FINDINGS:

Heart is normal in size. Small bilateral pleural effusions are seen with 

consolidation in the bilateral lung bases which may be from passive 

atelectasis or pneumonia. Liver is normal in morphology and measures 15 cm

in craniocaudal dimension. Small amount of perihepatic ascites is seen. No

arterially enhancing lesions seen. Spleen is unenlarged. Gallstone or 

sludge in the gallbladder. Fatty infiltration in the pancreas. Adrenal 

glands demonstrate no nodularity. Bilaterally atrophic kidneys. NG tube is

seen with its tip in the distal stomach. Moderate atherosclerotic plaque 

is seen at the origin of the celiac axis causing moderate narrowing. 

Diffuse atherosclerotic plaque is seen in the splenic artery which is 

patent. Left gastric artery is patent. Moderate narrowing is seen in the 

region of the common hepatic artery which remains patent. Left and right 

hepatic arteries are patent. Moderate atherosclerotic plaque in the SMA 

causing multifocal 50 percent narrowing. Bilateral renal arteries are 

patent. Occluded EDITA. Main portal vein and left and right portal veins are

patent. Splenic vein is patent. No pneumoperitoneum. No suspicious bony 

lesion.

 

IMPRESSION:

No apparent focal liver lesion seen. Please see above for vascular 

details.

Bilateral pleural effusions with consolidation in the bilateral lung bases

which may be secondary to pneumonia or passive atelectasis.

 

Electronically signed by: Calvin Longoria DO (12/12/2019 4:12 PM) [facility]

## 2019-12-12 NOTE — RAD
Examination: BRAIN IMG W/ VASCULAR FLOW

 

History: Brain death evaluation

 

Comparison/Correlation: 12/9/2019 CT head without contrast

 

Findings: 30 mCi technetium 99m pertechnetate was intravenously 

administered for brain flow evaluation. Cannot imaging was performed. Flow

images demonstrate absence of intracranial radiotracer accumulation. 

Delayed image demonstrates minimal radiotracer within the sagittal sinus 

without brain parenchymal perfusion.

 

 

Impression:

Absence of intracranial arterial flow. Minimal flow within the sagittal 

sinus on delayed images. Flow involving sagittal sinus on the delayed 

images likely relates to drainage of scalp venous vasculature. Findings 

overall corresponding with brain death in the appropriate clinical 

setting.

 

Electronically signed by: José Manuel Ravi MD (12/12/2019 2:07 PM) [facility]

## 2019-12-12 NOTE — RAD
CHEST AP ONLY

 

Clinical indications: On the ventilator. Follow-up study.

 

COMPARISON: December 11, 2019. 

 

Findings: There've been no interval tube or line changes. Persistent left 

lung base consolidation is seen and there is persistent left midlung zone 

infiltrate. There has been improvement in the size of the left effusion 

which is now small. Right perihilar lung infiltrate or pulmonary edema is 

stable. The heart size, pulmonary vasculature, mediastinum and both ami 

are stable.

 

Impression: Improvement of left-sided pleural effusion from the prior 

study.

 

Electronically signed by: Bob Shoemaker MD (12/12/2019 4:21 PM) [facility]

## 2019-12-13 LAB
ANION GAP SERPL CALC-SCNC: 16 MMOL/L (ref 6–14)
BASE EXCESS ABG: -7 MMOL/L (ref -3–3)
BUN SERPL-MCNC: 58 MG/DL (ref 8–26)
CALCIUM SERPL-MCNC: 7.8 MG/DL (ref 8.5–10.1)
CHLORIDE SERPL-SCNC: 103 MMOL/L (ref 98–107)
CO2 SERPL-SCNC: 20 MMOL/L (ref 21–32)
CREAT SERPL-MCNC: 4.4 MG/DL (ref 0.7–1.3)
ERYTHROCYTE [DISTWIDTH] IN BLOOD BY AUTOMATED COUNT: 14.3 % (ref 11.5–14.5)
GFR SERPLBLD BASED ON 1.73 SQ M-ARVRAT: 13.6 ML/MIN
GLUCOSE SERPL-MCNC: 174 MG/DL (ref 70–99)
HCO3 BLDA-SCNC: 19 MMOL/L (ref 21–28)
HCT VFR BLD CALC: 36.1 % (ref 39–53)
HGB BLD-MCNC: 11.9 G/DL (ref 13–17.5)
INSPIRATION SETTING TIME VENT: 100
MCH RBC QN AUTO: 28 PG (ref 25–35)
MCHC RBC AUTO-ENTMCNC: 33 G/DL (ref 31–37)
MCV RBC AUTO: 86 FL (ref 79–100)
PCO2 BLDA: 41 MMHG (ref 35–46)
PCO2 TEMP ADJ BLD: 40 MMHG
PH TEMP ADJ BLD: 7.29 [PH]
PLATELET # BLD AUTO: 129 X10^3/UL (ref 140–400)
PO2 BLDA: 118 MMHG (ref 65–108)
PO2 TEMP ADJ BLD: 114 MMHG
POTASSIUM SERPL-SCNC: 5.3 MMOL/L (ref 3.5–5.1)
RBC # BLD AUTO: 4.2 X10^6/UL (ref 4.3–5.7)
SAO2 % BLDA: 98 % (ref 92–99)
SODIUM SERPL-SCNC: 139 MMOL/L (ref 136–145)
WBC # BLD AUTO: 24.6 X10^3/UL (ref 4–11)